# Patient Record
Sex: FEMALE | Race: WHITE | NOT HISPANIC OR LATINO | URBAN - METROPOLITAN AREA
[De-identification: names, ages, dates, MRNs, and addresses within clinical notes are randomized per-mention and may not be internally consistent; named-entity substitution may affect disease eponyms.]

---

## 2023-04-18 ENCOUNTER — OFFICE VISIT (OUTPATIENT)
Dept: URBAN - METROPOLITAN AREA SURGERY CENTER 15 | Facility: SURGERY CENTER | Age: 63
End: 2023-04-18

## 2024-04-26 ENCOUNTER — OV NP (OUTPATIENT)
Dept: URBAN - METROPOLITAN AREA CLINIC 111 | Facility: CLINIC | Age: 64
End: 2024-04-26
Payer: COMMERCIAL

## 2024-04-26 ENCOUNTER — LAB (OUTPATIENT)
Dept: URBAN - METROPOLITAN AREA CLINIC 111 | Facility: CLINIC | Age: 64
End: 2024-04-26

## 2024-04-26 VITALS
TEMPERATURE: 98.1 F | DIASTOLIC BLOOD PRESSURE: 76 MMHG | HEIGHT: 65 IN | SYSTOLIC BLOOD PRESSURE: 135 MMHG | BODY MASS INDEX: 21.99 KG/M2 | WEIGHT: 132 LBS | HEART RATE: 76 BPM

## 2024-04-26 DIAGNOSIS — F10.10 ALCOHOL ABUSE: ICD-10-CM

## 2024-04-26 DIAGNOSIS — K76.0 FATTY LIVER: ICD-10-CM

## 2024-04-26 DIAGNOSIS — R10.30 LOWER ABDOMINAL PAIN: ICD-10-CM

## 2024-04-26 PROCEDURE — 99244 OFF/OP CNSLTJ NEW/EST MOD 40: CPT | Performed by: PHYSICIAN ASSISTANT

## 2024-04-26 RX ORDER — DICYCLOMINE HYDROCHLORIDE 20 MG/1
1 TABLET TABLET ORAL THREE TIMES A DAY
Qty: 90 | Refills: 1 | OUTPATIENT
Start: 2024-04-26 | End: 2024-06-25

## 2024-04-26 NOTE — HPI-TODAY'S VISIT:
62 y/o female here with lower abdominal pain. This patient was referred by Georgie Lawson PA-C to see Dr. Amanda. A copy of this will be sent to the referring provider. Colonoscopy in 2014 by Dr. Schroeder with 1 polyp and tortuous colon. Path with lymphoid aggregate.   It is intermittent and has been there for a year or so. She states it is more persistent recently. She also has bloating and burning. Pain radiates into back. This week pain has been bad. No changes in stool. No N/V. She does not feel the pain at night. No triggers for the pain she has noticed. She has stools every couple of days. No blood in the stool. Colonoscopy in 11/2023 that was negative.   She had abdominal x-ray and U/S that were both okay at PCP office yesterday. No urinary symptoms. She admits to drinking a lot of ETOH. She drinks a bottle of wine daily and has been for years. She states U/S showed mild fatty liver. She reports liver numbers have not been high. Pain is not severe. It feels like a burning in lower abdomen. It is not stopping her from doing things. Sometimes has burning in epigastrium. She tried Pepcid which did not help. Denies acid reflux. No NSAIDs. No FH of GI cancer.

## 2024-05-06 ENCOUNTER — LAB OUTSIDE AN ENCOUNTER (OUTPATIENT)
Dept: URBAN - METROPOLITAN AREA CLINIC 23 | Facility: CLINIC | Age: 64
End: 2024-05-06

## 2024-05-06 LAB
CREATININE POC: 0.6
PERFORMING LAB: (no result)

## 2024-05-07 ENCOUNTER — TELEPHONE ENCOUNTER (OUTPATIENT)
Dept: URBAN - METROPOLITAN AREA CLINIC 111 | Facility: CLINIC | Age: 64
End: 2024-05-07

## 2024-05-07 ENCOUNTER — LAB OUTSIDE AN ENCOUNTER (OUTPATIENT)
Dept: URBAN - METROPOLITAN AREA CLINIC 111 | Facility: CLINIC | Age: 64
End: 2024-05-07

## 2024-05-13 ENCOUNTER — OFFICE VISIT (OUTPATIENT)
Dept: URBAN - METROPOLITAN AREA SURGERY CENTER 15 | Facility: SURGERY CENTER | Age: 64
End: 2024-05-13

## 2024-05-14 ENCOUNTER — TELEPHONE ENCOUNTER (OUTPATIENT)
Dept: URBAN - METROPOLITAN AREA CLINIC 19 | Facility: CLINIC | Age: 64
End: 2024-05-14

## 2024-06-04 ENCOUNTER — OFFICE VISIT (OUTPATIENT)
Dept: URBAN - METROPOLITAN AREA SURGERY CENTER 15 | Facility: SURGERY CENTER | Age: 64
End: 2024-06-04

## 2024-06-07 ENCOUNTER — OFFICE VISIT (OUTPATIENT)
Dept: URBAN - METROPOLITAN AREA LAB 3 | Facility: LAB | Age: 64
End: 2024-06-07

## 2025-07-07 ENCOUNTER — OFFICE VISIT (OUTPATIENT)
Dept: URBAN - METROPOLITAN AREA CLINIC 78 | Facility: CLINIC | Age: 65
End: 2025-07-07

## 2025-07-07 NOTE — PHYSICAL EXAM CHEST:
no lesions, no deformities, no traumatic injuries, no significant scars are present, chest wall non-tender, no masses present, breathing is unlabored without accessory muscle use,normal breath sounds 30-Aug-2021

## 2025-07-07 NOTE — HPI-TODAY'S VISIT:
This patient was referred by Georgie Lawson PA-C for abdominal distention. A copy of this will be sent to the referring provider.  She was last seen by Andres Hardin in 2024 for abdominal pain.   It is intermittent and has been there for a year or so. She states it is more persistent recently. She also has bloating and burning. Pain radiates into back. This week pain has been bad. No changes in stool. No N/V. She does not feel the pain at night. No triggers for the pain she has noticed. She has stools every couple of days. No blood in the stool.  She had  No urinary symptoms. She admits to drinking a lot of ETOH. She drinks a bottle of wine daily and has been for years. She states U/S showed mild fatty liver. She reports liver numbers have not been high. Pain is not severe. It feels like a burning in lower abdomen. It is not stopping her from doing things. Sometimes has burning in epigastrium. She tried Pepcid which did not help. Denies acid reflux. No NSAIDs. No FH of GI cancer.  Summary of prior workup: - U/S in 2024: Fatty liver - Colonoscopy in 11/2023 that was unremarkable.  - Colonoscopy in 2014 by Dr. Schroeder with 1 polyp and tortuous colon. Path with lymphoid aggrega

## 2025-07-14 ENCOUNTER — DASHBOARD ENCOUNTERS (OUTPATIENT)
Age: 65
End: 2025-07-14

## 2025-07-14 ENCOUNTER — OFFICE VISIT (OUTPATIENT)
Dept: URBAN - METROPOLITAN AREA CLINIC 78 | Facility: CLINIC | Age: 65
End: 2025-07-14

## 2025-07-14 PROBLEM — 197321007: Status: ACTIVE | Noted: 2025-07-14

## 2025-07-22 ENCOUNTER — OFFICE VISIT (OUTPATIENT)
Dept: URBAN - METROPOLITAN AREA CLINIC 78 | Facility: CLINIC | Age: 65
End: 2025-07-22

## 2025-07-22 NOTE — HPI-TODAY'S VISIT:
This patient was referred by Georgie Lawson PA-C for abdominal distention. A copy of this will be sent to the referring provider.  She was last seen by Andres Hardin in 2024 for abdominal pain.   It is intermittent and has been there for a year or so. She states it is more persistent recently. She also has bloating and burning. Pain radiates into back. This week pain has been bad. No changes in stool. No N/V. She does not feel the pain at night. No triggers for the pain she has noticed. She has stools every couple of days. No blood in the stool.  She had  No urinary symptoms. She admits to drinking a lot of ETOH. She drinks a bottle of wine daily and has been for years. She states U/S showed mild fatty liver. She reports liver numbers have not been high. Pain is not severe. It feels like a burning in lower abdomen. It is not stopping her from doing things. Sometimes has burning in epigastrium. She tried Pepcid which did not help. Denies acid reflux. No NSAIDs. No FH of GI cancer.  Summary of prior workup: - CT abdomen and pelvis with contrast on 5/6/2024: Wall thickening involving the terminal ileum and proximal and mid colon most in keeping with inflammation/infection.  Consider nonspecific enteritis with differential diagnosis possibly including inflammatory bowel disease.  Small polypoid focus within the terminal ileum, indeterminate but possibly prominent mucosal fold or soft tissue polyp.  Hepatic steatosis.  Borderline wall thickening of urinary bladder possibly due in part to underdistended state.  Consider cystitis. - U/S in 2024: Fatty liver - Colonoscopy in 11/2023 that was unremarkable.  - Colonoscopy in 2014 by Dr. Schroeder with 1 polyp and tortuous colon. Path with lymphoid aggrega

## 2025-08-14 ENCOUNTER — OFFICE VISIT (OUTPATIENT)
Dept: URBAN - METROPOLITAN AREA CLINIC 78 | Facility: CLINIC | Age: 65
End: 2025-08-14